# Patient Record
Sex: FEMALE | Race: WHITE | URBAN - METROPOLITAN AREA
[De-identification: names, ages, dates, MRNs, and addresses within clinical notes are randomized per-mention and may not be internally consistent; named-entity substitution may affect disease eponyms.]

---

## 2017-09-12 ENCOUNTER — TELEPHONE (OUTPATIENT)
Dept: BARIATRICS/WEIGHT MGMT | Age: 52
End: 2017-09-12

## 2017-09-15 ENCOUNTER — TELEPHONE (OUTPATIENT)
Dept: BARIATRICS/WEIGHT MGMT | Age: 52
End: 2017-09-15

## 2017-10-13 ENCOUNTER — OFFICE VISIT (OUTPATIENT)
Dept: BARIATRICS/WEIGHT MGMT | Age: 52
End: 2017-10-13

## 2017-10-13 VITALS
SYSTOLIC BLOOD PRESSURE: 132 MMHG | HEIGHT: 60 IN | HEART RATE: 68 BPM | WEIGHT: 251 LBS | DIASTOLIC BLOOD PRESSURE: 84 MMHG | BODY MASS INDEX: 49.28 KG/M2

## 2017-10-13 DIAGNOSIS — F32.A DEPRESSION, UNSPECIFIED DEPRESSION TYPE: ICD-10-CM

## 2017-10-13 DIAGNOSIS — Z79.899 HIGH RISK MEDICATIONS (NOT ANTICOAGULANTS) LONG-TERM USE: ICD-10-CM

## 2017-10-13 DIAGNOSIS — Z71.3 DIETARY COUNSELING AND SURVEILLANCE: ICD-10-CM

## 2017-10-13 DIAGNOSIS — E66.01 MORBID OBESITY WITH BMI OF 45.0-49.9, ADULT (HCC): ICD-10-CM

## 2017-10-13 DIAGNOSIS — R06.83 SNORING: ICD-10-CM

## 2017-10-13 DIAGNOSIS — E66.01 MORBID OBESITY WITH BMI OF 45.0-49.9, ADULT (HCC): Primary | ICD-10-CM

## 2017-10-13 LAB
A/G RATIO: 1.8 (ref 1.1–2.2)
ALBUMIN SERPL-MCNC: 4.6 G/DL (ref 3.4–5)
ALP BLD-CCNC: 118 U/L (ref 40–129)
ALT SERPL-CCNC: 37 U/L (ref 10–40)
ANION GAP SERPL CALCULATED.3IONS-SCNC: 14 MMOL/L (ref 3–16)
AST SERPL-CCNC: 22 U/L (ref 15–37)
BASOPHILS ABSOLUTE: 0 K/UL (ref 0–0.2)
BASOPHILS RELATIVE PERCENT: 0.4 %
BILIRUB SERPL-MCNC: 0.5 MG/DL (ref 0–1)
BUN BLDV-MCNC: 14 MG/DL (ref 7–20)
CALCIUM SERPL-MCNC: 9.6 MG/DL (ref 8.3–10.6)
CHLORIDE BLD-SCNC: 100 MMOL/L (ref 99–110)
CHOLESTEROL, TOTAL: 200 MG/DL (ref 0–199)
CO2: 24 MMOL/L (ref 21–32)
CREAT SERPL-MCNC: 0.7 MG/DL (ref 0.6–1.1)
EOSINOPHILS ABSOLUTE: 0.2 K/UL (ref 0–0.6)
EOSINOPHILS RELATIVE PERCENT: 1.8 %
FOLATE: 6.68 NG/ML (ref 4.78–24.2)
GFR AFRICAN AMERICAN: >60
GFR NON-AFRICAN AMERICAN: >60
GLOBULIN: 2.5 G/DL
GLUCOSE BLD-MCNC: 136 MG/DL (ref 70–99)
HCT VFR BLD CALC: 46.9 % (ref 36–48)
HDLC SERPL-MCNC: 47 MG/DL (ref 40–60)
HEMOGLOBIN: 15.5 G/DL (ref 12–16)
LDL CHOLESTEROL CALCULATED: 114 MG/DL
LYMPHOCYTES ABSOLUTE: 3.2 K/UL (ref 1–5.1)
LYMPHOCYTES RELATIVE PERCENT: 29.9 %
MCH RBC QN AUTO: 28.7 PG (ref 26–34)
MCHC RBC AUTO-ENTMCNC: 33 G/DL (ref 31–36)
MCV RBC AUTO: 86.9 FL (ref 80–100)
MONOCYTES ABSOLUTE: 0.6 K/UL (ref 0–1.3)
MONOCYTES RELATIVE PERCENT: 5.9 %
NEUTROPHILS ABSOLUTE: 6.7 K/UL (ref 1.7–7.7)
NEUTROPHILS RELATIVE PERCENT: 62 %
PDW BLD-RTO: 13.5 % (ref 12.4–15.4)
PLATELET # BLD: 367 K/UL (ref 135–450)
PMV BLD AUTO: 7.9 FL (ref 5–10.5)
POTASSIUM SERPL-SCNC: 4.6 MMOL/L (ref 3.5–5.1)
RBC # BLD: 5.4 M/UL (ref 4–5.2)
SODIUM BLD-SCNC: 138 MMOL/L (ref 136–145)
TOTAL PROTEIN: 7.1 G/DL (ref 6.4–8.2)
TRIGL SERPL-MCNC: 193 MG/DL (ref 0–150)
TSH REFLEX: 1.28 UIU/ML (ref 0.27–4.2)
VITAMIN B-12: 335 PG/ML (ref 211–911)
VITAMIN D 25-HYDROXY: 16.5 NG/ML
VLDLC SERPL CALC-MCNC: 39 MG/DL
WBC # BLD: 10.8 K/UL (ref 4–11)

## 2017-10-13 PROCEDURE — 99204 OFFICE O/P NEW MOD 45 MIN: CPT | Performed by: FAMILY MEDICINE

## 2017-10-13 PROCEDURE — 93000 ELECTROCARDIOGRAM COMPLETE: CPT | Performed by: FAMILY MEDICINE

## 2017-10-13 RX ORDER — DOXYCYCLINE HYCLATE 100 MG/1
100 CAPSULE ORAL 2 TIMES DAILY
COMMUNITY
End: 2018-03-29

## 2017-10-13 RX ORDER — PAROXETINE HYDROCHLORIDE 20 MG/1
20 TABLET, FILM COATED ORAL EVERY MORNING
COMMUNITY

## 2017-10-13 RX ORDER — ZOLPIDEM TARTRATE 10 MG/1
TABLET ORAL NIGHTLY PRN
COMMUNITY

## 2017-10-13 ASSESSMENT — ENCOUNTER SYMPTOMS
EYES NEGATIVE: 1
RESPIRATORY NEGATIVE: 1
GASTROINTESTINAL NEGATIVE: 1

## 2017-10-13 NOTE — PATIENT INSTRUCTIONS
meal.  · Details about each meal (like eating out or at home, eating alone, or with friends or family). · What you do to be active. Look and plan. As you track, look for patterns that you may want to change. Take note of:  · When you eat and whether you skip meals. · How often you eat out. · How many fruits and vegetables you eat. · When you eat beyond feeling full. · When and why you eat for reasons other than being hungry. When you stray from your plan, don't get upset. Figure out what made you slip up and how you can fix it. Can you take medicines or have surgery to lose weight? Before your doctor will prescribe medicines or surgery, he or she will probably want you to be more active and follow your healthy eating plan for a period of time. These habits are key lifelong changes for managing your weight, with or without other medical treatment. And these changes can help you avoid weight-related health problems. Follow-up care is a key part of your treatment and safety. Be sure to make and go to all appointments, and call your doctor if you are having problems. It's also a good idea to know your test results and keep a list of the medicines you take. Where can you learn more? Go to https://Double[employer] [place] ([employer]. org and sign in to your TapTrack account. Enter N111 in the [employer] box to learn more about \"Learning About Obesity. \"     If you do not have an account, please click on the \"Sign Up Now\" link. Current as of: October 13, 2016  Content Version: 11.3  © 8570-0793 [employer]. Care instructions adapted under license by [facility] ([facility]). If you have questions about a medical condition or this instruction, always ask your healthcare professional. [address] any warranty or liability for your use of this information.

## 2017-10-13 NOTE — PROGRESS NOTES
patient denies a history thyroid disease. The patient denies a history of glaucoma. Depression: Stable on meds. Dietitian's assessment reviewed and addressed with patient. Reviewed:  [x] Nutrition and the importance of regular protein intake       [x] Hidden CHO/carbohydrate sources  [x] Alcohol use  [x] Tobacco use  [x] Drug use- Denies   [x] Importance of exercise and reducing sedentary time        No Known Allergies      Current Outpatient Prescriptions:     zolpidem (AMBIEN) 10 MG tablet, Take by mouth nightly as needed for Sleep, Disp: , Rfl:     PARoxetine (PAXIL) 20 MG tablet, Take 20 mg by mouth every morning, Disp: , Rfl:     doxycycline hyclate (VIBRAMYCIN) 100 MG capsule, Take 100 mg by mouth 2 times daily, Disp: , Rfl:     UNABLE TO FIND, Invermectin 3mg once a week, Disp: , Rfl:     There is no problem list on file for this patient. Past Medical History:   Diagnosis Date    Depression     Knee joint pain     Migraine        Past Surgical History:   Procedure Laterality Date     SECTION      CYST REMOVAL      DILATION AND CURETTAGE OF UTERUS         Family History   Problem Relation Age of Onset    Diabetes Mother     Breast Cancer Mother     Depression Mother     COPD Mother     Depression Maternal Grandmother        Review of Systems   Constitutional:        Weight gain    HENT: Negative. Eyes: Negative. Respiratory: Negative. Cardiovascular: Negative. Gastrointestinal: Negative. Endocrine: Negative. Musculoskeletal: Negative. Neurological: Negative. Psychiatric/Behavioral: Negative. Physical Exam   Constitutional: She is oriented to person, place, and time. She appears well-developed and well-nourished. HENT:   Head: Normocephalic. Eyes: Conjunctivae and EOM are normal.   Neck: Normal range of motion. Neck supple. No thyromegaly present. Cardiovascular: Normal rate, regular rhythm and normal heart sounds.   Exam reveals no the lab results. She understands that medications are most effective as part of a comprehensive treatment plan that includes nutrition, physical activity, and behavior modification. The patient also understands that she will need close follow-ups every 2-4 weeks if she starts medication. Depending on the patient's success with these changes, she may also be a good candidate for bariatric surgery down the line. Follow-up in 2 weeks to discuss lab results and treatment plan. Z68.42 V85.42 Comprehensive Metabolic Panel      Hemoglobin A1C      Lipid Panel      TSH with Reflex      Vitamin B12 & Folate      Vitamin D 25 Hydroxy      CBC Auto Differential   2. High risk medications (not anticoagulants) long-term use Z79.899 V58.69 EKG 12 Lead   3. Dietary counseling and surveillance Z71.3 V65.3 Greater than 50% of this 50 minute visit was used in direct counseling. 4. Depression, unspecified depression type F32.9 311 Stable. Denies any new or worsening symptoms. 5. Snoring: Counseled on the importance of sleep and weight management. Will refer her to Dr. Loly Briggs for further evaluation.         Nutrition:  [x] LCHF [] Low carb/low-calorie diet [] Low-calorie diet     []Maintenance        FITTE:   [x] Cardio [x] Resistance/stength exercises   [x] ACSM recommendations (150 minutes/week)    [] Prevention of weight gain (150-250 minutes/week)        Behavior:   [x]Motivational interviewing performed    [] Referral for counseling  [x]Discussed strategies to overcome habits/challenges for focus      [x] Stress management   [] Stimulus control      Orders Placed This Encounter   Procedures    Comprehensive Metabolic Panel     Standing Status:   Future     Standing Expiration Date:   10/13/2018    Hemoglobin A1C     Standing Status:   Future     Standing Expiration Date:   10/13/2018    Lipid Panel     Standing Status:   Future     Standing Expiration Date:   1/13/2018     Order Specific Question:   Is Patient Fasting?/# of Hours     Answer:   yes, 8 hours    TSH with Reflex     Standing Status:   Future     Standing Expiration Date:   10/13/2018    Vitamin B12 & Folate     Standing Status:   Future     Standing Expiration Date:   10/13/2018    Vitamin D 25 Hydroxy     Standing Status:   Future     Standing Expiration Date:   10/13/2018    CBC Auto Differential     Standing Status:   Future     Standing Expiration Date:   10/14/2018    EKG 12 Lead     Order Specific Question:   Reason for Exam?     Answer: Other       No Follow-up on file. This dictation was performed with a verbal recognition program (Dragon) and all efforts were made to ensure accuracy of this dictation. It is possible that there are still dictated errors within this note. If so, please bring any errors to my attention for correction.

## 2017-10-13 NOTE — PROGRESS NOTES
Antony Garcia is a 49 y.o. female with a date of birth of 1965. Vitals:    10/13/17 0924   BP: 132/84   Pulse: 68   Weight: 251 lb (113.9 kg)   Height: 5' (1.524 m)    BMI: Body mass index is 49.02 kg/m². Obesity Classification: Class III    Weight History: Wt Readings from Last 3 Encounters:   10/13/17 251 lb (113.9 kg)       Patient's lowest adult weight was 135-150 lb at age 33. Patient's highest adult weight was 250 lb, currently. Patient has participated in the following weight loss programs: Weight Watcher Anonymous, calorie restriction, and physician supervised diet. Patient has not participated in meal replacement/liquid diets. Patient has participated in weight loss medications. -Adipex, couldn't sleep well, racing heart- took 2x: once in her 20's ( lost 20-30#) second time (20#). Patient is not lactose intolerant. Patient does not have Advent/cultural food concerns. Patient does not have food allergies. Patient dines out to a sit down restaurant 1 times per week. Patient dines out to a fast food restaurant 4-5 times per week d/t lack of time. 24 hour recall/food frequency chart:  Breakfast: yes. Bagel with cream cheese, 2 coffees w/ cream OR arby's ham and cheese croissant and 2 coffees w/ cream and sugar  Snack: yes. Granola bar  Lunch: yes. Ham and cheese sandwich, cup chili, sweet tea from [employer] OR bologna sandwich on white bread, pretzels w/ ranch dip  Snack: yes. Pumpkin pie OR apple   Dinner: yes. Snickers bar OR 2 hamburger patties, grilled peppers, onions and cottage cheese  Snack: yes. Drumstick ice cream  Drinks throughout the day: Has stopped drinking soda the past month. Has been drinking lemonade, sweet tea (1/2 cut from [employer]) and water- 4-5 glasses daily. Do you drink alcohol? Yes. How often/how much alcohol do you drink: 1 Beers and 1 Glasses of wine per week on a normal week. Patient does meet the criteria for binge eating disorder. Patient does have grazing after work. Patient does have night eating. Patient does have a history of emotional eating or eating out of boredom. It does take an unusually large amount of food for the patient to feel comfortably full. Most days the patient eats until she is full. Patient describes level of activity as poor. Knee pain so physical activity is challenging but got a fit bit and had started challenging herself more with it- then knee pain started. Goals  Weight: 130#  Health Improvement: Decreased knee pain and increase activity level, extend life. Assessment  Nutritional Needs: RMR=(9.99 x 113.9) + (6.25 x 152.4) - (4.92 x 51 y.o.) -161= 2356 kcal x 1.4 (low activity factor)= 2356 kcal - 1000 (for 2 lb weight loss/week)= 1356 kcal.    Plan  Plan/Recommendations: General weight loss/lifestyle modification strategies discussed (elicit support from others; identify saboteurs; non-food rewards, etc). Optifast: May be interested in the future  Diet Medications:  Interested in discussing with MD    PES Statement:  Overweight/Obesity related to increased caloric intake and decreased physical activity as evidenced by BMI. Body mass index is 49.02 kg/m². Will follow up as necessary.     Korey Rader

## 2017-10-14 LAB
ESTIMATED AVERAGE GLUCOSE: 162.8 MG/DL
HBA1C MFR BLD: 7.3 %

## 2017-10-24 ENCOUNTER — TELEPHONE (OUTPATIENT)
Dept: BARIATRICS/WEIGHT MGMT | Age: 52
End: 2017-10-24

## 2017-10-24 DIAGNOSIS — E55.9 VITAMIN D DEFICIENCY: Primary | ICD-10-CM

## 2017-10-24 RX ORDER — ERGOCALCIFEROL 1.25 MG/1
50000 CAPSULE ORAL WEEKLY
Qty: 8 CAPSULE | Refills: 0 | Status: SHIPPED | OUTPATIENT
Start: 2017-10-24 | End: 2018-01-02

## 2018-01-02 ENCOUNTER — OFFICE VISIT (OUTPATIENT)
Dept: BARIATRICS/WEIGHT MGMT | Age: 53
End: 2018-01-02

## 2018-01-02 VITALS
WEIGHT: 244.5 LBS | DIASTOLIC BLOOD PRESSURE: 74 MMHG | HEIGHT: 60 IN | HEART RATE: 84 BPM | SYSTOLIC BLOOD PRESSURE: 132 MMHG | BODY MASS INDEX: 48 KG/M2

## 2018-01-02 DIAGNOSIS — Z71.3 DIETARY COUNSELING AND SURVEILLANCE: ICD-10-CM

## 2018-01-02 DIAGNOSIS — E11.69 DIABETES MELLITUS TYPE 2 IN OBESE (HCC): ICD-10-CM

## 2018-01-02 DIAGNOSIS — E66.01 MORBID OBESITY WITH BMI OF 45.0-49.9, ADULT (HCC): Primary | ICD-10-CM

## 2018-01-02 DIAGNOSIS — E55.9 VITAMIN D DEFICIENCY: ICD-10-CM

## 2018-01-02 DIAGNOSIS — E66.9 DIABETES MELLITUS TYPE 2 IN OBESE (HCC): ICD-10-CM

## 2018-01-02 PROCEDURE — 99214 OFFICE O/P EST MOD 30 MIN: CPT | Performed by: FAMILY MEDICINE

## 2018-01-02 RX ORDER — ACETAMINOPHEN 160 MG
1 TABLET,DISINTEGRATING ORAL DAILY
COMMUNITY

## 2018-01-02 ASSESSMENT — ENCOUNTER SYMPTOMS
RESPIRATORY NEGATIVE: 1
EYES NEGATIVE: 1
GASTROINTESTINAL NEGATIVE: 1

## 2018-01-02 NOTE — PATIENT INSTRUCTIONS
changes in your eating habits. Instead of a diet, focus on lifestyle changes that will improve your health and achieve the right balance of energy and calories. To lose weight, you need to burn more calories than you take in. You can do it by eating healthy foods in reasonable amounts and becoming more active, even a little bit every day. Making small changes over time can add up to a lot. Make a plan for change. Many people have found that naming their reasons for change and staying focused on their plan can make a big difference. Work with your doctor to create a plan that is right for you. · Ask yourself: Joy Hernandez are my personal, most powerful reasons for wanting this change? What will my life look like when I've made the change? \"  · Set your long-term goal. Make it specific, such as \"I will lose x pounds. \"  · Break your long-term goal into smaller, short-term goals. Make these small steps specific and within your reach, things you know you can do. These steps are what keep you going from day to day. How can you stay on your plan for change? Be ready. Choose to start during a time when there are few events that might trigger slip-ups, like holidays, social events, and high-stress periods. Decide on your first few steps. Most people have more success when they make small changes, one step at a time. For example, you might switch a daily candy bar to a piece of fruit, walk 10 minutes more, or add more vegetables to a meal.  Line up your support people. Make sure you're not going to be alone as you make this change. Connect with people who understand how important it is to you. Ask family members and friends for help in keeping with your plan. And think about who could make it harder for you, and how to handle them. Try tracking. People who keep track of what they eat, feel, and do are better at losing weight. Try writing down things like:  · What and how much you eat.   · How you feel before and after each

## 2018-01-02 NOTE — PROGRESS NOTES
Patient: Greg Fraire                      Encounter Date: 1/2/2018    YOB: 1965               Age: 47 y.o. Chief Complaint   Patient presents with    Weight Management     2nd MWM,        /74   Pulse 84   Ht 5' (1.524 m)   Wt 244 lb 8 oz (110.9 kg)   BMI 47.75 kg/m²     Body mass index is 47.75 kg/m². HPI: 47 y.o. female with a long-standing history of obesity presents today for follow-up. She's lost 6.5 pounds since her last visit in October. Current treatment includes low carb diet and exercise. Met with the dietitian today. Food recall and assessment reviewed. Overall, making good dietary choices. She lost more weight initially but regained some weight back around the holidays. She would like to continue to focus on lifestyle modification for now and hold off on aom. She is aware of her lab results from 10/13 (discussed with Emma Josue, CNP and PCP). HbA1c: 7.3- on 6 months of therapeutic lifestyle changes for now. Diet: [x]Low carb [x]Modified low-calorie/low carb diet           []Low-calorie diet          []Maintenance       []Other:           Side effects: No         Exercise: []Cardio     []Resistance/strength training     [x]Other: None    No Known Allergies      Current Outpatient Prescriptions:     Cholecalciferol (VITAMIN D3) 2000 units CAPS, Take 1 capsule by mouth daily, Disp: , Rfl:     zolpidem (AMBIEN) 10 MG tablet, Take by mouth nightly as needed for Sleep, Disp: , Rfl:     PARoxetine (PAXIL) 20 MG tablet, Take 20 mg by mouth every morning, Disp: , Rfl:     doxycycline hyclate (VIBRAMYCIN) 100 MG capsule, Take 100 mg by mouth 2 times daily, Disp: , Rfl:     UNABLE TO FIND, Invermectin 3mg once a week, Disp: , Rfl:     There is no problem list on file for this patient. Review of Systems   HENT: Negative. Eyes: Negative. Respiratory: Negative. Cardiovascular: Negative. Gastrointestinal: Negative. Endocrine: Negative. Musculoskeletal: Negative. Neurological: Negative. Psychiatric/Behavioral: Negative. Physical Exam   Constitutional: She is oriented to person, place, and time. She appears well-developed and well-nourished. Cardiovascular: Normal rate, regular rhythm and normal heart sounds. Exam reveals no gallop and no friction rub. No murmur heard. Pulmonary/Chest: Effort normal and breath sounds normal. No respiratory distress. She has no wheezes. She has no rales. Neurological: She is alert and oriented to person, place, and time. Skin: Skin is warm and dry. Psychiatric: She has a normal mood and affect. Her behavior is normal. Judgment and thought content normal.       Orders Only on 10/13/2017   Component Date Value Ref Range Status    Sodium 10/13/2017 138  136 - 145 mmol/L Final    Potassium 10/13/2017 4.6  3.5 - 5.1 mmol/L Final    Chloride 10/13/2017 100  99 - 110 mmol/L Final    CO2 10/13/2017 24  21 - 32 mmol/L Final    Anion Gap 10/13/2017 14  3 - 16 Final    Glucose 10/13/2017 136* 70 - 99 mg/dL Final    BUN 10/13/2017 14  7 - 20 mg/dL Final    CREATININE 10/13/2017 0.7  0.6 - 1.1 mg/dL Final    GFR Non- 10/13/2017 >60  >60 Final    Comment: >60 mL/min/1.73m2 EGFR, calc. for ages 25 and older using the  MDRD formula (not corrected for weight), is valid for stable  renal function.  GFR  10/13/2017 >60  >60 Final    Comment: Chronic Kidney Disease: less than 60 ml/min/1.73 sq.m. Kidney Failure: less than 15 ml/min/1.73 sq.m. Results valid for patients 18 years and older.       Calcium 10/13/2017 9.6  8.3 - 10.6 mg/dL Final    Total Protein 10/13/2017 7.1  6.4 - 8.2 g/dL Final    Alb 10/13/2017 4.6  3.4 - 5.0 g/dL Final    Albumin/Globulin Ratio 10/13/2017 1.8  1.1 - 2.2 Final    Total Bilirubin 10/13/2017 0.5  0.0 - 1.0 mg/dL Final    Alkaline Phosphatase 10/13/2017 118  40 - 129 U/L Final    ALT 10/13/2017 37  10 - 40 U/L Final  AST 10/13/2017 22  15 - 37 U/L Final    Globulin 10/13/2017 2.5  g/dL Final    Hemoglobin A1C 10/14/2017 7.3  See comment % Final    Comment: Comment:  Diagnosis of Diabetes: > or = 6.5%  Increased risk of diabetes (Prediabetes): 5.7-6.4%  Glycemic Control: Nonpregnant Adults: <7.0%                    Pregnant: <6.0%        eAG 10/14/2017 162.8  mg/dL Final    Cholesterol, Total 10/13/2017 200* 0 - 199 mg/dL Final    Triglycerides 10/13/2017 193* 0 - 150 mg/dL Final    HDL 10/13/2017 47  40 - 60 mg/dL Final    LDL Calculated 10/13/2017 114* <100 mg/dL Final    VLDL CHOLESTEROL CALCULATED 10/13/2017 39  Not Established mg/dL Final    TSH 10/13/2017 1.28  0.27 - 4.20 uIU/mL Final    Vitamin B-12 10/13/2017 335  211 - 911 pg/mL Final    Folate 10/13/2017 6.68  4.78 - 24.20 ng/mL Final    Comment: Effective 11-15-16 10:00am EST  Please note reference ranges have  changed for Folate.  Vit D, 25-Hydroxy 10/13/2017 16.5* >=30 ng/mL Final    Comment: <=20 ng/mL. ........... [place] Deficient  21-29 ng/mL. ......... [place] Insufficient  >=30 ng/mL. ........ [place] Sufficient      WBC 10/13/2017 10.8  4.0 - 11.0 K/uL Final    RBC 10/13/2017 5.40* 4.00 - 5.20 M/uL Final    Hemoglobin 10/13/2017 15.5  12.0 - 16.0 g/dL Final    Hematocrit 10/13/2017 46.9  36.0 - 48.0 % Final    MCV 10/13/2017 86.9  80.0 - 100.0 fL Final    MCH 10/13/2017 28.7  26.0 - 34.0 pg Final    MCHC 10/13/2017 33.0  31.0 - 36.0 g/dL Final    RDW 10/13/2017 13.5  12.4 - 15.4 % Final    Platelets 28/76/7314 367  135 - 450 K/uL Final    MPV 10/13/2017 7.9  5.0 - 10.5 fL Final    Neutrophils % 10/13/2017 62.0  % Final    Lymphocytes % 10/13/2017 29.9  % Final    Monocytes % 10/13/2017 5.9  % Final    Eosinophils % 10/13/2017 1.8  % Final    Basophils % 10/13/2017 0.4  % Final    Neutrophils # 10/13/2017 6.7  1.7 - 7.7 K/uL Final    Lymphocytes # 10/13/2017 3.2  1.0 - 5.1 K/uL Final    Monocytes # 10/13/2017 0.6  0.0 - 1.3 K/uL Final   

## 2018-02-12 ENCOUNTER — OFFICE VISIT (OUTPATIENT)
Dept: BARIATRICS/WEIGHT MGMT | Age: 53
End: 2018-02-12

## 2018-02-12 VITALS
DIASTOLIC BLOOD PRESSURE: 80 MMHG | HEIGHT: 60 IN | SYSTOLIC BLOOD PRESSURE: 124 MMHG | WEIGHT: 238.5 LBS | HEART RATE: 72 BPM | BODY MASS INDEX: 46.82 KG/M2

## 2018-02-12 DIAGNOSIS — Z71.3 DIETARY COUNSELING AND SURVEILLANCE: ICD-10-CM

## 2018-02-12 DIAGNOSIS — E66.01 MORBID OBESITY WITH BMI OF 45.0-49.9, ADULT (HCC): Primary | ICD-10-CM

## 2018-02-12 PROCEDURE — 99213 OFFICE O/P EST LOW 20 MIN: CPT | Performed by: FAMILY MEDICINE

## 2018-02-12 NOTE — PATIENT INSTRUCTIONS
Patient Education        Learning About Obesity  What is obesity? Obesity means having so much body fat that your health is in danger. Having too much body fat can lead to type 2 diabetes, heart disease, high blood pressure, arthritis, sleep apnea, and stroke. Even if you don't feel bad now, think about these health risks. Do they seem like a good reason to start on a new path toward a healthier weight? Or do you have another personal, powerful reason for wanting to lose weight? Whatever it is, keep it in mind. It can be hard to change eating habits and exercise habits. But with your own reason and plan, you can do it. How do you know if your weight is in the obesity range? To know if your weight is in the obesity range, your doctor looks at your body mass index (BMI) and waist size. Your BMI is a number that is calculated from your weight and your height. To figure your BMI for yourself, get a BMI table from your doctor or use an online tool, such as http://www.rose.com/ on the [employer]. What causes obesity? When you take in more calories than you burn off, you gain weight. How you eat, how active you are, and other things affect how your body uses calories and whether you gain weight. If you have family members who have too much body fat, you may have inherited a tendency to gain weight. And your family also helps form your eating and lifestyle habits, which can lead to obesity. Also, our busy lives make it harder to plan and cook healthy meals. For many of us, it's easier to reach for prepared foods, go out to eat, or go to the drive-through. But these foods are often high in saturated fat and calories. Portions are often too large. What can you do to reach a healthy weight? Focus on health, not diets. Diets are hard to stay on and don't work in the long run.  It is very hard to stay with a diet that includes lots of big

## 2018-02-12 NOTE — PROGRESS NOTES
appears well-developed and well-nourished. Neurological: She is alert and oriented to person, place, and time. Skin: Skin is warm and dry. Psychiatric: She has a normal mood and affect. Her behavior is normal. Judgment and thought content normal.       Orders Only on 10/13/2017   Component Date Value Ref Range Status    Sodium 10/13/2017 138  136 - 145 mmol/L Final    Potassium 10/13/2017 4.6  3.5 - 5.1 mmol/L Final    Chloride 10/13/2017 100  99 - 110 mmol/L Final    CO2 10/13/2017 24  21 - 32 mmol/L Final    Anion Gap 10/13/2017 14  3 - 16 Final    Glucose 10/13/2017 136* 70 - 99 mg/dL Final    BUN 10/13/2017 14  7 - 20 mg/dL Final    CREATININE 10/13/2017 0.7  0.6 - 1.1 mg/dL Final    GFR Non- 10/13/2017 >60  >60 Final    Comment: >60 mL/min/1.73m2 EGFR, calc. for ages 25 and older using the  MDRD formula (not corrected for weight), is valid for stable  renal function.  GFR  10/13/2017 >60  >60 Final    Comment: Chronic Kidney Disease: less than 60 ml/min/1.73 sq.m. Kidney Failure: less than 15 ml/min/1.73 sq.m. Results valid for patients 18 years and older.       Calcium 10/13/2017 9.6  8.3 - 10.6 mg/dL Final    Total Protein 10/13/2017 7.1  6.4 - 8.2 g/dL Final    Alb 10/13/2017 4.6  3.4 - 5.0 g/dL Final    Albumin/Globulin Ratio 10/13/2017 1.8  1.1 - 2.2 Final    Total Bilirubin 10/13/2017 0.5  0.0 - 1.0 mg/dL Final    Alkaline Phosphatase 10/13/2017 118  40 - 129 U/L Final    ALT 10/13/2017 37  10 - 40 U/L Final    AST 10/13/2017 22  15 - 37 U/L Final    Globulin 10/13/2017 2.5  g/dL Final    Hemoglobin A1C 10/14/2017 7.3  See comment % Final    Comment: Comment:  Diagnosis of Diabetes: > or = 6.5%  Increased risk of diabetes (Prediabetes): 5.7-6.4%  Glycemic Control: Nonpregnant Adults: <7.0%                    Pregnant: <6.0%        eAG 10/14/2017 162.8  mg/dL Final    Cholesterol, Total 10/13/2017 200* 0 - 199 mg/dL Final    used in direct counseling. Nutrition plan: [] LCHF/Ketogenic   [x] Modified low-calorie diet (low carb/low-sai)               [] Low-calorie diet    []Maintenance       []Other    Exercise: [x]Cardio     [x]Resistance/strength training                       [x]ACSM recommendations (150 minutes/week in active weight loss)                              Behavior: [x]Motivational interviewing performed    [] Referral for counseling                         [x]Discussed strategies to overcome habits/challenges for focus         [] Stress management   [] Stimulus control                    [] Sleep hygiene    Reviewed:  [x] Nutrition and the importance of regular protein intake  [x] Hidden carbohydrate sources  [x] Alcohol use  [x] Tobacco use   [x] Importance of exercise and reducing sedentary time      No orders of the defined types were placed in this encounter. No Follow-up on file. This dictation was performed with a verbal recognition program (Dragon) and all efforts were made to ensure accuracy of this dictation. It is possible that there are still dictated errors within this note. If so, please bring any errors to my attention for correction.

## 2018-02-14 ASSESSMENT — ENCOUNTER SYMPTOMS
RESPIRATORY NEGATIVE: 1
GASTROINTESTINAL NEGATIVE: 1
EYES NEGATIVE: 1

## 2018-03-29 ENCOUNTER — OFFICE VISIT (OUTPATIENT)
Dept: BARIATRICS/WEIGHT MGMT | Age: 53
End: 2018-03-29

## 2018-03-29 VITALS
SYSTOLIC BLOOD PRESSURE: 114 MMHG | DIASTOLIC BLOOD PRESSURE: 78 MMHG | HEART RATE: 60 BPM | WEIGHT: 238.5 LBS | HEIGHT: 60 IN | BODY MASS INDEX: 46.82 KG/M2

## 2018-03-29 DIAGNOSIS — Z71.3 DIETARY COUNSELING AND SURVEILLANCE: ICD-10-CM

## 2018-03-29 DIAGNOSIS — E66.01 MORBID OBESITY WITH BMI OF 45.0-49.9, ADULT (HCC): Primary | ICD-10-CM

## 2018-03-29 PROCEDURE — 99213 OFFICE O/P EST LOW 20 MIN: CPT | Performed by: FAMILY MEDICINE

## 2018-03-29 ASSESSMENT — ENCOUNTER SYMPTOMS
RESPIRATORY NEGATIVE: 1
GASTROINTESTINAL NEGATIVE: 1
EYES NEGATIVE: 1

## 2018-03-29 NOTE — PATIENT INSTRUCTIONS
meal.  · Details about each meal (like eating out or at home, eating alone, or with friends or family). · What you do to be active. Look and plan. As you track, look for patterns that you may want to change. Take note of:  · When you eat and whether you skip meals. · How often you eat out. · How many fruits and vegetables you eat. · When you eat beyond feeling full. · When and why you eat for reasons other than being hungry. When you stray from your plan, don't get upset. Figure out what made you slip up and how you can fix it. Can you take medicines or have surgery to lose weight? Before your doctor will prescribe medicines or surgery, he or she will probably want you to be more active and follow your healthy eating plan for a period of time. These habits are key lifelong changes for managing your weight, with or without other medical treatment. And these changes can help you avoid weight-related health problems. Follow-up care is a key part of your treatment and safety. Be sure to make and go to all appointments, and call your doctor if you are having problems. It's also a good idea to know your test results and keep a list of the medicines you take. Where can you learn more? Go to [email or web address] org and sign in to your [employer] account. Enter N111 in the [employer] box to learn more about \"Learning About Obesity. \"     If you do not have an account, please click on the \"Sign Up Now\" link. Current as of: October 13, 2016  Content Version: 11.5  © 3596-1702 [employer]. Care instructions adapted under license by [facility] ([facility]). If you have questions about a medical condition or this instruction, always ask your healthcare professional. [address] any warranty or liability for your use of this information.

## 2018-03-29 NOTE — PROGRESS NOTES
 LDL Calculated 10/13/2017 114* <100 mg/dL Final    VLDL Cholesterol Calculated 10/13/2017 39  Not Established mg/dL Final    TSH 10/13/2017 1.28  0.27 - 4.20 uIU/mL Final    Vitamin B-12 10/13/2017 335  211 - 911 pg/mL Final    Folate 10/13/2017 6.68  4.78 - 24.20 ng/mL Final    Comment: Effective 11-15-16 10:00am EST  Please note reference ranges have  changed for Folate.  Vit D, 25-Hydroxy 10/13/2017 16.5* >=30 ng/mL Final    Comment: <=20 ng/mL. ........... Harriet Mojgan Deficient  21-29 ng/mL. ......... Harriet Mojgan Insufficient  >=30 ng/mL. ........ Harriet Mojgan Sufficient      WBC 10/13/2017 10.8  4.0 - 11.0 K/uL Final    RBC 10/13/2017 5.40* 4.00 - 5.20 M/uL Final    Hemoglobin 10/13/2017 15.5  12.0 - 16.0 g/dL Final    Hematocrit 10/13/2017 46.9  36.0 - 48.0 % Final    MCV 10/13/2017 86.9  80.0 - 100.0 fL Final    MCH 10/13/2017 28.7  26.0 - 34.0 pg Final    MCHC 10/13/2017 33.0  31.0 - 36.0 g/dL Final    RDW 10/13/2017 13.5  12.4 - 15.4 % Final    Platelets 56/01/3645 367  135 - 450 K/uL Final    MPV 10/13/2017 7.9  5.0 - 10.5 fL Final    Neutrophils % 10/13/2017 62.0  % Final    Lymphocytes % 10/13/2017 29.9  % Final    Monocytes % 10/13/2017 5.9  % Final    Eosinophils % 10/13/2017 1.8  % Final    Basophils % 10/13/2017 0.4  % Final    Neutrophils # 10/13/2017 6.7  1.7 - 7.7 K/uL Final    Lymphocytes # 10/13/2017 3.2  1.0 - 5.1 K/uL Final    Monocytes # 10/13/2017 0.6  0.0 - 1.3 K/uL Final    Eosinophils # 10/13/2017 0.2  0.0 - 0.6 K/uL Final    Basophils # 10/13/2017 0.0  0.0 - 0.2 K/uL Final         Assessment and Plan:    ICD-10-CM ICD-9-CM    1. Morbid obesity with BMI of 45.0-49.9, adult (HCC) E66.01 278.01 Stable. Encouraged patient to get back on track with following the prescribed meal plan. Reinforced dietitian's recommendations. Start exercising. Keep a food journal and bring it to next visit in 68 weeks. Z68.42 V85.42    2.  Dietary counseling and surveillance Z71.3 V65.3 Greater than 50% of this 15 minute visit was used in direct counseling. Nutrition plan: [] LCHF/Ketogenic   [x] Modified low-calorie diet (low carb/low-sai)               [] Low-calorie diet    []Maintenance       []Other    Exercise: [x]Cardio     [x]Resistance/strength training                       [x]ACSM recommendations (150 minutes/week in active weight loss)                              Behavior: [x]Motivational interviewing performed    [] Referral for counseling                         [x]Discussed strategies to overcome habits/challenges for focus         [] Stress management   [x] Stimulus control                    [] Sleep hygiene    Reviewed:  [x] Nutrition and the importance of regular protein intake  [x] Hidden carbohydrate sources  [x] Alcohol use  [x] Tobacco use   [x] Importance of exercise and reducing sedentary time      No orders of the defined types were placed in this encounter. No Follow-up on file. This dictation was performed with a verbal recognition program (Dragon) and all efforts were made to ensure accuracy of this dictation. It is possible that there are still dictated errors within this note. If so, please bring any errors to my attention for correction.

## 2019-01-16 LAB
CHOLESTEROL, FASTING: 221 MG/DL (ref 0–199)
GLUCOSE FASTING: 218 MG/DL (ref 70–99)
HDLC SERPL-MCNC: 48 MG/DL (ref 40–60)
LDL CHOLESTEROL CALCULATED: 126 MG/DL
TRIGLYCERIDE, FASTING: 234 MG/DL (ref 0–150)
TSH SERPL DL<=0.05 MIU/L-ACNC: 1.41 UIU/ML (ref 0.27–4.2)
VLDLC SERPL CALC-MCNC: 47 MG/DL

## 2019-01-17 LAB
ESTIMATED AVERAGE GLUCOSE: 197.3 MG/DL
HBA1C MFR BLD: 8.5 %

## 2019-11-26 LAB
HPV COMMENT: NORMAL
HPV TYPE 16: NOT DETECTED
HPV TYPE 18: NOT DETECTED
HPVOH (OTHER TYPES): NOT DETECTED